# Patient Record
Sex: MALE | Race: WHITE | NOT HISPANIC OR LATINO | Employment: FULL TIME | URBAN - METROPOLITAN AREA
[De-identification: names, ages, dates, MRNs, and addresses within clinical notes are randomized per-mention and may not be internally consistent; named-entity substitution may affect disease eponyms.]

---

## 2022-08-02 ENCOUNTER — HOSPITAL ENCOUNTER (EMERGENCY)
Facility: CLINIC | Age: 32
Discharge: HOME | End: 2022-08-02
Attending: EMERGENCY MEDICINE | Admitting: EMERGENCY MEDICINE

## 2022-08-02 VITALS
SYSTOLIC BLOOD PRESSURE: 157 MMHG | RESPIRATION RATE: 16 BRPM | WEIGHT: 227 LBS | OXYGEN SATURATION: 97 % | DIASTOLIC BLOOD PRESSURE: 102 MMHG | HEART RATE: 81 BPM | TEMPERATURE: 98.2 F
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
## 2022-08-02 PROBLEM — R03.0 ELEVATED BP WITHOUT DIAGNOSIS OF HYPERTENSION: Status: ACTIVE | Noted: 2018-01-31

## 2022-08-02 PROBLEM — F12.90 MARIJUANA USE: Status: ACTIVE | Noted: 2018-07-30

## 2022-08-02 PROBLEM — F41.9 ANXIETY: Status: ACTIVE | Noted: 2022-01-20

## 2022-08-02 PROBLEM — K64.4 EXTERNAL HEMORRHOID: Status: ACTIVE | Noted: 2018-07-30

## 2022-08-02 PROBLEM — N48.9 PENILE LESION: Status: ACTIVE | Noted: 2022-01-20

## 2022-08-02 PROBLEM — B07.0 PLANTAR WARTS: Status: ACTIVE | Noted: 2022-01-20

## 2022-08-02 PROBLEM — R07.89 CHEST DISCOMFORT: Status: ACTIVE | Noted: 2018-07-30

## 2022-08-02 PROBLEM — H53.8 BLURRY VISION, BILATERAL: Status: ACTIVE | Noted: 2022-01-20

## 2022-08-02 PROBLEM — R06.83 SNORING: Status: ACTIVE | Noted: 2022-01-20

## 2022-08-02 PROCEDURE — 99282 EMERGENCY DEPT VISIT SF MDM: CPT

## 2022-08-02 RX ORDER — BENZOCAINE/MENTHOL 6 MG-10 MG
LOZENGE MUCOUS MEMBRANE 2 TIMES DAILY
Qty: 30 G | Refills: 0 | Status: SHIPPED | OUTPATIENT
Start: 2022-08-02

## 2022-08-02 ASSESSMENT — ENCOUNTER SYMPTOMS: WOUND: 1

## 2022-08-03 NOTE — ED PROVIDER NOTES
EMERGENCY DEPARTMENT ENCOUNTER     NAME: Darrin Baez   AGE: 32 year old male   YOB: 1990   MRN: [id number]   EVALUATION DATE & TIME: No admission date for patient encounter.   PCP: No primary care provider on file.     Chief Complaint   Patient presents with     Insect Bite   :    FINAL IMPRESSION       1. Insect bite, unspecified site, initial encounter           ED COURSE & MEDICAL DECISION MAKING      Pertinent Labs & Imaging studies reviewed. (See chart for details)   32 year old male  presents to the Emergency Department for evaluation of an insect bite on the leg, concerned about a tick bite. Initial Vitals Reviewed. Initial exam notable for well-appearing male who has several mosquito bites that are excoriated on his legs.  1 of these, that looks similar to others and is not consistent with a targetoid lesion with a tick bite and has no obvious evidence of an embedded tick shows some local tissue reaction likely from excoriation.  I provided reassurance and we discussed antihistamines and hydrocortisone cream.  He is comfortable with discharge at this time.        9:12 PM I met with the patient to gather history and to perform my initial exam. I discussed the plan for care while in the Emergency Department.   I discussed the plan for discharge with the patient, and patient is agreeable.  We discussed supportive cares at home and reasons for return to the ER including new or worsening symptoms - all questions and concerns addressed.  Patient to be discharged by RN.     At the conclusion of the encounter I discussed the results of all of the tests and the disposition. The questions were answered. The patient or family acknowledged understanding and was agreeable with the care plan.         MEDICATIONS GIVEN IN THE EMERGENCY:   Medications - No data to display   NEW PRESCRIPTIONS STARTED AT TODAY'S ER VISIT   New Prescriptions    No medications on file      ================================================================   HISTORY OF PRESENT ILLNESS       Patient information was obtained from: Patient   Use of Intrepreter: N/A   Darrin Baez is a 32 year old male with history of hypertension who presents for an insect bite.      Patient endorses being bitten by a mosquito on 7/30/2022. He says he has been itching it a lot. Today, patient states the red area has gotten bigger. He denies anything having been attached or latching onto the bite. Patient denies any other complaints at this time.     ================================================================    REVIEW OF SYSTEMS       Review of Systems   Skin: Positive for wound (Bite on right anterior lower part of leg).   All other systems reviewed and are negative.        PAST HISTORY     PAST MEDICAL HISTORY:   No past medical history on file.   PAST SURGICAL HISTORY:   No past surgical history on file.   CURRENT MEDICATIONS:   No current outpatient medications on file.    ALLERGIES:   Allergies   Allergen Reactions     Nsaids       FAMILY HISTORY:   No family history on file.   SOCIAL HISTORY:         VITALS  Patient Vitals for the past 24 hrs:   BP Temp Temp src Pulse Resp SpO2 Weight   08/02/22 2054 (!) 178/107 98.2  F (36.8  C) Temporal 81 16 97 % 103 kg (227 lb)        ================================================================    PHYSICAL EXAM     VITAL SIGNS: BP (!) 178/107   Pulse 81   Temp 98.2  F (36.8  C) (Temporal)   Resp 16   Wt 103 kg (227 lb)   SpO2 97%    Constitutional:  Awake, no acute distress   HENT:  Atraumatic, oropharynx without exudate or erythema, membranes moist  Lymph:  No adenopathy  Eyes: EOM intact, PERRL, no injection  Neck: Supple  Respiratory:  Clear to auscultation bilaterally, no wheezes or crackles   Cardiovascular:  Regular rate and rhythm, single S1 and S2   GI:  Soft, nontender, nondistended, no rebound or guarding   Musculoskeletal:  Moves all extremities,  no lower extremity edema, no deformities    Skin:  Warm, dry. Multiple excoriated mosquito bites and the one on the right lower shin has some surrounding local tissue reaction and erythema.   Neurologic:  Alert and oriented x3, no focal deficits noted         ================================================================  LAB       All pertinent labs reviewed and interpreted.   Labs Ordered and Resulted from Time of ED Arrival to Time of ED Departure - No data to display     ===============================================================  RADIOLOGY       Reviewed all pertinent imaging. Please see official radiology report.   No orders to display       ================================================================  PROCEDURES         I, Tegan Simons, am serving as a scribe to document services personally performed by Dr. Degroot based on my observation and the provider's statements to me. I, Mei Degroot MD attest that Tegan Simons is acting in a scribe capacity, has observed my performance of the services and has documented them in accordance with my direction.   Mei Degroot M.D.   Emergency Medicine   [facility] EMERGENCY ROOM  [address] [id number] [phone]  Dept: [phone]      Mei Degroot MD  08/02/22 1053

## 2022-08-03 NOTE — DISCHARGE INSTRUCTIONS
Fortunately this does not appear to be a tick bite and looks like local tissue reaction from likely a mosquito or other similar bite.  Use the cream provided and also take Benadryl to help with this.

## 2022-08-03 NOTE — ED TRIAGE NOTES
Here for bug bite on right anterior lower part of leg that occurred on Saturday   Pt concerned and anxious that it may be a tick bite   Denies any other concerns     Triage Assessment     Row Name 08/02/22 2055       Triage Assessment (Adult)    Airway WDL WDL       Respiratory WDL    Respiratory WDL WDL       Skin Circulation/Temperature WDL    Skin Circulation/Temperature WDL WDL       Cardiac WDL    Cardiac WDL WDL       Peripheral/Neurovascular WDL    Peripheral Neurovascular WDL WDL       Cognitive/Neuro/Behavioral WDL    Cognitive/Neuro/Behavioral WDL WDL

## 2024-04-19 ENCOUNTER — OFFICE VISIT (OUTPATIENT)
Dept: FAMILY MEDICINE | Facility: CLINIC | Age: 34
End: 2024-04-19
Payer: COMMERCIAL

## 2024-04-19 VITALS
OXYGEN SATURATION: 99 % | HEART RATE: 54 BPM | SYSTOLIC BLOOD PRESSURE: 145 MMHG | TEMPERATURE: 98 F | DIASTOLIC BLOOD PRESSURE: 85 MMHG | RESPIRATION RATE: 16 BRPM

## 2024-04-19 DIAGNOSIS — H66.90 ACUTE OTITIS MEDIA, UNSPECIFIED OTITIS MEDIA TYPE: ICD-10-CM

## 2024-04-19 DIAGNOSIS — H60.502 ACUTE OTITIS EXTERNA OF LEFT EAR, UNSPECIFIED TYPE: Primary | ICD-10-CM

## 2024-04-19 PROCEDURE — 99213 OFFICE O/P EST LOW 20 MIN: CPT | Performed by: PHYSICIAN ASSISTANT

## 2024-04-19 RX ORDER — AMOXICILLIN 875 MG
875 TABLET ORAL 2 TIMES DAILY
Qty: 14 TABLET | Refills: 0 | Status: SHIPPED | OUTPATIENT
Start: 2024-04-19 | End: 2024-04-26

## 2024-04-19 RX ORDER — CIPROFLOXACIN AND DEXAMETHASONE 3; 1 MG/ML; MG/ML
4 SUSPENSION/ DROPS AURICULAR (OTIC) 2 TIMES DAILY
Qty: 7.5 ML | Refills: 0 | Status: SHIPPED | OUTPATIENT
Start: 2024-04-19 | End: 2024-04-26

## 2024-04-19 NOTE — PROGRESS NOTES
Assessment & Plan     Acute otitis externa of left ear, unspecified type  Cipro dex, warm compresses.  Ibuprofen for pain.  Follow-up for persistent or worsening sx.  Added amoxil to cover otitis media, the portion of the TM I can see is erythematous and edematous, with previous hx of otitis as well.      - ciprofloxacin-dexAMETHasone (CIPRODEX) 0.3-0.1 % otic suspension  Dispense: 7.5 mL; Refill: 0  - amoxicillin (AMOXIL) 875 MG tablet  Dispense: 14 tablet; Refill: 0    Acute otitis media, unspecified otitis media type as above   - amoxicillin (AMOXIL) 875 MG tablet  Dispense: 14 tablet; Refill: 0        Rocio Montes De Oca PA-C  [facility]    Ross Clifford is a 34 year old male who presents to clinic today for the following health issues:  Chief Complaint   Patient presents with    Ear Problem     Has left ear pain     HPI  Pt presents with L ear pain   Plugged sensation fist, blunted hearing followd by  pain, drainage from the L ear.   No uri sx.    Hearing : Muffled.    Pain to the touch.    No ear bud use.  No trauma. No instrumentation to the L canal.  Hx of previous otitis.        Review of Systems  Constitutional, HEENT, cardiovascular, pulmonary, gi and gu systems are negative, except as otherwise noted.      Objective    BP (!) 145/85   Pulse 54   Temp 98  F (36.7  C) (Oral)   Resp 16   SpO2 99%   Physical Exam   Pt is in no acute distress and appears well  Ears patent B:  TM intact on the R non-injected. All land marks easily visibile on the R.   L has edematous and erythematous canal with TTP of the Auricle and pinna, swelling anterior canal primarily.  About 50% of the TM visible due to swelling and is brightly erythematous.  There is white thick debris in the canal. No blood noted.

## 2024-08-16 ENCOUNTER — OFFICE VISIT (OUTPATIENT)
Dept: FAMILY MEDICINE | Facility: CLINIC | Age: 34
End: 2024-08-16
Payer: COMMERCIAL

## 2024-08-16 VITALS
DIASTOLIC BLOOD PRESSURE: 90 MMHG | RESPIRATION RATE: 16 BRPM | SYSTOLIC BLOOD PRESSURE: 155 MMHG | OXYGEN SATURATION: 97 % | TEMPERATURE: 98.2 F | HEART RATE: 58 BPM | WEIGHT: 223 LBS

## 2024-08-16 DIAGNOSIS — J06.9 UPPER RESPIRATORY TRACT INFECTION, UNSPECIFIED TYPE: Primary | ICD-10-CM

## 2024-08-16 PROCEDURE — 99213 OFFICE O/P EST LOW 20 MIN: CPT | Performed by: PHYSICIAN ASSISTANT

## 2024-08-16 PROCEDURE — 87635 SARS-COV-2 COVID-19 AMP PRB: CPT | Performed by: PHYSICIAN ASSISTANT

## 2024-08-16 NOTE — PROGRESS NOTES
Assessment & Plan     Upper respiratory tract infection, unspecified type  Covid 19 pending.    If positive should seek treatment with paxlovid, pt is a candidate given hx of anxiety.   Push fluids, rest and ibuprofen or tylenol for comfort.    RTC for persistent or worsening sx.   At the end of the encounter, I discussed results, diagnosis, medications. Discussed red flags for immediate return to clinic/ER, as well as indications for follow up if no improvement. Patient understood and agreed to plan. Patient was stable for discharge      - Symptomatic COVID-19 Virus (Coronavirus) by PCR Nose         Rocio Montes De Oca PA-C  [facility]    Ross Clifford is a 34 year old male who presents to clinic today for the following health issues:  Chief Complaint   Patient presents with    Covid 19 Testing     Covid exposure. Girlfriend tested positive. Took test on Tuesday and negative. Yesterday started getting chest congestion, slight dry cough. Did test again yesterday and still negative.          8/16/2024     5:02 PM   Additional Questions   Roomed by Mary BOLAÑOS   Accompanied by Self     HPI  Pt is a 34-year-old male presents to urgent care with concerns regarding URI symptoms x 2 days.  He has been exposed to COVID-19 by his girlfriend.  Started getting symptoms 8-15-24 with burning chest in the morning, negative covid 19 yesterday and today increased sx with dry cough,   Mild cough.    Mild rhionrrhea congestion over the last 2 days.      Review of Systems  Constitutional, HEENT, cardiovascular, pulmonary, gi and gu systems are negative, except as otherwise noted.      Objective    BP (!) 155/90   Pulse 58   Temp 98.2  F (36.8  C) (Oral)   Resp 16   Wt 101.2 kg (223 lb)   SpO2 97%   Physical Exam             Patient Active Problem List   Diagnosis    Anxiety    Blurry vision, bilateral    Chest discomfort    Elevated BP without diagnosis of hypertension    External hemorrhoid     Penile lesion    Marijuana use    Plantar warts    Snoring

## 2024-08-17 ENCOUNTER — TELEPHONE (OUTPATIENT)
Dept: NURSING | Facility: CLINIC | Age: 34
End: 2024-08-17
Payer: COMMERCIAL

## 2024-08-17 LAB — SARS-COV-2 RNA RESP QL NAA+PROBE: POSITIVE

## 2024-08-17 NOTE — TELEPHONE ENCOUNTER
Coronavirus (COVID-19) Notification    Caller Name (Patient, parent, daughter/son, grandparent, etc)  Patient    Reason for call  Notify of Positive Coronavirus (COVID-19) lab results, assess symptoms,  review [facility] recommendations    Lab Result    Lab test:  2019-nCoV rRt-PCR or SARS-CoV-2 PCR    Oropharyngeal AND/OR nasopharyngeal swabs is POSITIVE for 2019-nCoV RNA/SARS-COV-2 PCR (COVID-19 virus)      Gather patient reported symptoms   Assessment   Current Symptoms at time of phone call, reported by patient: (if no symptoms, document: No symptoms] yes   Date of symptom(s) onset (if applicable) 08/15/24     If at time of call, Patients symptoms have worsened, the Patient should contact 911 or have someone drive them to Emergency Dept promptly:    If Patient calling 911, inform 911 personal that you have tested positive for the Coronavirus (COVID-19).  Place mask on and await 911 to arrive.  If Emergency Dept, If possible, please have another adult drive you to the Emergency Dept but you need to wear mask when in contact with other people.      Treatment Options:   Is patient interested in discussing COVID treatment? No.        Review information with Patient    Your result was positive. This means you have COVID-19 (coronavirus).    How can I protect others?    These guidelines are for isolating before returning to work, school or .    If you DO have symptoms  Stay home and away from others   For at least 5 days after your symptoms started, AND  You are fever free for 24 hours (with no medicine that reduces fever), AND  Your other symptoms are better  Wear a mask for 10 full days anytime you are around others    If you DON'T have symptoms  Stay home and away from others for at least 5 days after your positive test  Wear a mask for 10 full days anytime you are around others    There may be different guidelines for healthcare facilities.  Please check with the specific sites before arriving.    If  you have been told by a doctor that you were severely ill with COVID-19 or are immunocompromised, you should isolate for at least 10 days.    You should not go back to work until you meet the guidelines above for ending your home isolation. You don't need to be retested for COVID-19 before going back to work--studies show that you won't spread the virus if it's been at least 10 days since your symptoms started (or 20 days, if you have a weak immune system).    Employers, schools, and daycares: This is an official notice for this person's medical guidelines for returning in-person.  They must meet the above guidelines before going back to work, school or  in person.    You will receive a positive COVID-19 letter via [employer] or the mail soon with additional self-care information.    Would you like me to review some of that information with you now?  Yes    How can I take care of myself?    Get lots of rest. Drink extra fluids (unless a doctor has told you not to).    Take Tylenol (acetaminophen) for fever or pain. If you have liver or kidney problems, ask your family doctor if it's okay to take Tylenol.     Take either:   650 mg (two 325 mg pills) every 4 to 6 hours, or   1,000 mg (two 500 mg pills) every 8 hours as needed.   Note: Do not take more than 3,000 mg in one day. Acetaminophen is found in many medicines (both prescribed and over-the-counter medicines). Read all labels to be sure you don't take too much.    For children, check the Tylenol bottle for the right dose (based on their age or weight).    If you have other health problems (like cancer, heart failure, an organ transplant or severe kidney disease): Call your specialty clinic if you don't feel better in the next 2 days.    Know when to call 911: Emergency warning signs include:  Trouble breathing or shortness of breath  Pain or pressure in the chest that doesn't go away  Feeling confused like you haven't felt before, or not being able to wake  up  Bluish-colored lips or face      If you were tested for an upcoming procedure, please contact your provider for next steps.    Karen Espinal

## 2024-09-13 ENCOUNTER — OFFICE VISIT (OUTPATIENT)
Dept: FAMILY MEDICINE | Facility: CLINIC | Age: 34
End: 2024-09-13
Payer: COMMERCIAL

## 2024-09-13 VITALS
TEMPERATURE: 98.4 F | DIASTOLIC BLOOD PRESSURE: 76 MMHG | SYSTOLIC BLOOD PRESSURE: 134 MMHG | HEART RATE: 62 BPM | WEIGHT: 223 LBS | OXYGEN SATURATION: 96 %

## 2024-09-13 DIAGNOSIS — H66.002 ACUTE SUPPURATIVE OTITIS MEDIA OF LEFT EAR WITHOUT SPONTANEOUS RUPTURE OF TYMPANIC MEMBRANE, RECURRENCE NOT SPECIFIED: Primary | ICD-10-CM

## 2024-09-13 PROCEDURE — 99203 OFFICE O/P NEW LOW 30 MIN: CPT | Performed by: PHYSICIAN ASSISTANT

## 2024-09-13 RX ORDER — AMOXICILLIN 875 MG
875 TABLET ORAL 2 TIMES DAILY
Qty: 14 TABLET | Refills: 0 | Status: SHIPPED | OUTPATIENT
Start: 2024-09-13 | End: 2024-09-20

## 2024-09-13 NOTE — PROGRESS NOTES
Assessment & Plan     1. Acute suppurative otitis media of left ear without spontaneous rupture of tympanic membrane, recurrence not specified  The patient has an exam consistent with otitis media.  There is no sign of perforation, mastoiditis. There may be a component of otitis externa, but patient does not want ear drops. Patients symptoms are improved after ear lavage. Discussed if pain does not return in 3 days, OK to forego abx. Prescription for amoxicillin sent to pharmacy. He may take Tylenol for pain.  Return if increasing pain, fever, hearing decrease or discharge.      - amoxicillin (AMOXIL) 875 MG tablet; Take 1 tablet (875 mg) by mouth 2 times daily for 7 days.  Dispense: 14 tablet; Refill: 0        Return in about 3 days (around 9/16/2024), or if symptoms worsen or fail to improve.    Diagnosis and treatment plan was reviewed with patient and/or family.   We went over any labs or imaging. Discussed worsening symptoms or little to no relief despite treatment plan to follow-up with PCP or return to clinic.  Patient verbalizes understanding. All questions were addressed and answered.     Daphne Jean Baptiste PA-C  [facility]    CHIEF COMPLAINT:   Chief Complaint   Patient presents with    Ear Problem     Pain in ear tenderness behind left ear also feels clogged. X 2 days.     Ross Clifford is a 34 year old male who presents to clinic today for evaluation of left-sided ear pain.  Symptoms started couple days ago, worsened last night.  Feels a little bit better today.  Hearing is muffled. He has been using OTC ear drops. No fever or chills.     Hx of otitis media and otitis externa back in April.       No past medical history on file.  No past surgical history on file.  Social History     Tobacco Use    Smoking status: Never    Smokeless tobacco: Never   Substance Use Topics    Alcohol use: Not on file     Current Outpatient Medications   Medication Sig Dispense Refill     amoxicillin (AMOXIL) 875 MG tablet Take 1 tablet (875 mg) by mouth 2 times daily for 7 days. 14 tablet 0    hydrocortisone (CORTAID) 1 % external cream Apply topically 2 times daily (Patient not taking: Reported on 4/19/2024) 30 g 0     No current facility-administered medications for this visit.     Allergies   Allergen Reactions    Nsaids        10 point ROS of systems were all negative except for pertinent positives noted in my HPI.      Exam:   /76   Pulse 62   Temp 98.4  F (36.9  C)   Wt 101.2 kg (223 lb)   SpO2 96%   Constitutional: healthy, alert and no distress  Head: Normocephalic, atraumatic.  ENT: L Tm is eythematous and bulging. R TM normal. Canal with slight inflammation on the left. nasal mucosa pink and moist, throat without tonsillar hypertrophy or erythema  Neck: neck is supple, no cervical lymphadenopathy or nuchal rigidity  Cardiovascular: RRR  Respiratory: CTA bilaterally, no rhonchi or rales      No results found for any visits on 09/13/24.

## 2024-09-16 ENCOUNTER — TELEPHONE (OUTPATIENT)
Dept: NURSING | Facility: CLINIC | Age: 34
End: 2024-09-16
Payer: COMMERCIAL

## 2024-09-16 NOTE — TELEPHONE ENCOUNTER
S-(situation):   Patient calling for advice if to flush ear.    B-(background):   9/13/2024  Acute suppurative otitis media of left ear without spontaneous rupture of tympanic membrane, recurrence not specified       Return in about 3 days (around 9/16/2024), or if symptoms worsen or fail to improve.       A-(assessment):   Pt has started antibiotics.  Noted discharge today and pain returning.    Pt wanted to know if he should (self) ear lavage.  Advised to monitor, introducing more bacteria to area may increase risk of infection.    R-(recommendations):   Advised an appoint to reassess ear.  Pt declined appointment at this time.    Patient instructed to call back if symptoms change or if new symptoms present. Patient verbalizes agreement with plan.    Florencia RN, BSN  [place], [place]

## 2024-09-23 ENCOUNTER — OFFICE VISIT (OUTPATIENT)
Dept: FAMILY MEDICINE | Facility: CLINIC | Age: 34
End: 2024-09-23
Payer: COMMERCIAL

## 2024-09-23 ENCOUNTER — TELEPHONE (OUTPATIENT)
Dept: FAMILY MEDICINE | Facility: CLINIC | Age: 34
End: 2024-09-23

## 2024-09-23 VITALS
TEMPERATURE: 98.7 F | RESPIRATION RATE: 18 BRPM | DIASTOLIC BLOOD PRESSURE: 68 MMHG | BODY MASS INDEX: 32.34 KG/M2 | OXYGEN SATURATION: 98 % | HEART RATE: 60 BPM | WEIGHT: 231 LBS | HEIGHT: 71 IN | SYSTOLIC BLOOD PRESSURE: 130 MMHG

## 2024-09-23 DIAGNOSIS — Z28.21 IMMUNIZATION DECLINED: ICD-10-CM

## 2024-09-23 DIAGNOSIS — H60.502 ACUTE OTITIS EXTERNA OF LEFT EAR, UNSPECIFIED TYPE: Primary | ICD-10-CM

## 2024-09-23 DIAGNOSIS — Z00.01 ENCOUNTER FOR ROUTINE ADULT MEDICAL EXAM WITH ABNORMAL FINDINGS: ICD-10-CM

## 2024-09-23 DIAGNOSIS — R06.83 SNORING: ICD-10-CM

## 2024-09-23 PROCEDURE — 99395 PREV VISIT EST AGE 18-39: CPT

## 2024-09-23 PROCEDURE — 99213 OFFICE O/P EST LOW 20 MIN: CPT | Mod: 25

## 2024-09-23 RX ORDER — CHLORAL HYDRATE 500 MG
2 CAPSULE ORAL DAILY
COMMUNITY

## 2024-09-23 RX ORDER — DIAPER,BRIEF,ADULT, DISPOSABLE
EACH MISCELLANEOUS
COMMUNITY

## 2024-09-23 RX ORDER — OFLOXACIN 3 MG/ML
1-2 SOLUTION/ DROPS OPHTHALMIC DAILY
Qty: 10 ML | Refills: 1 | Status: SHIPPED | OUTPATIENT
Start: 2024-09-23

## 2024-09-23 NOTE — TELEPHONE ENCOUNTER
Patient calling in had discussed using these with provider at [facility].    He found his other ear drops at home but they are   - ciprofloxacin-dexAMETHasone (CIPRODEX) 0.3-0.1 % otic suspension and not - ofloxacin (OCUFLOX) 0.3 % ophthalmic solution;   They are not  and he says he has a little over half a bottle.     Per provider via secure chat ok to use,     1-2 drops for 10 days RN called patient and relayed, patient agreeable.    ROBYN Ahumada  [facility]  [phone]    [facility]   Monday  - Thursday 7 AM - 6 PM    Friday  7 AM - 5 PM     -Please call your clinic for assistance from a nurse after hours.

## 2024-09-23 NOTE — PATIENT INSTRUCTIONS
Patient Education   Preventive Care Advice   This is general advice given by our system to help you stay healthy. However, your care team may have specific advice just for you. Please talk to your care team about your preventive care needs.  Nutrition  Eat 5 or more servings of fruits and vegetables each day.  Try wheat bread, brown rice and whole grain pasta (instead of white bread, rice, and pasta).  Get enough calcium and vitamin D. Check the label on foods and aim for 100% of the RDA (recommended daily allowance).  Lifestyle  Exercise at least 150 minutes each week  (30 minutes a day, 5 days a week).  Do muscle strengthening activities 2 days a week. These help control your weight and prevent disease.  No smoking.  Wear sunscreen to prevent skin cancer.  Have a dental exam and cleaning every 6 months.  Yearly exams  See your health care team every year to talk about:  Any changes in your health.  Any medicines your care team has prescribed.  Preventive care, family planning, and ways to prevent chronic diseases.  Shots (vaccines)   HPV shots (up to age 26), if you've never had them before.  Hepatitis B shots (up to age 59), if you've never had them before.  COVID-19 shot: Get this shot when it's due.  Flu shot: Get a flu shot every year.  Tetanus shot: Get a tetanus shot every 10 years.  Pneumococcal, hepatitis A, and RSV shots: Ask your care team if you need these based on your risk.  Shingles shot (for age 50 and up)  General health tests  Diabetes screening:  Starting at age 35, Get screened for diabetes at least every 3 years.  If you are younger than age 35, ask your care team if you should be screened for diabetes.  Cholesterol test: At age 39, start having a cholesterol test every 5 years, or more often if advised.  Bone density scan (DEXA): At age 50, ask your care team if you should have this scan for osteoporosis (brittle bones).  Hepatitis C: Get tested at least once in your life.  STIs (sexually  transmitted infections)  Before age 24: Ask your care team if you should be screened for STIs.  After age 24: Get screened for STIs if you're at risk. You are at risk for STIs (including HIV) if:  You are sexually active with more than one person.  You don't use condoms every time.  You or a partner was diagnosed with a sexually transmitted infection.  If you are at risk for HIV, ask about PrEP medicine to prevent HIV.  Get tested for HIV at least once in your life, whether you are at risk for HIV or not.  Cancer screening tests  Cervical cancer screening: If you have a cervix, begin getting regular cervical cancer screening tests starting at age 21.  Breast cancer scan (mammogram): If you've ever had breasts, begin having regular mammograms starting at age 40. This is a scan to check for breast cancer.  Colon cancer screening: It is important to start screening for colon cancer at age 45.  Have a colonoscopy test every 10 years (or more often if you're at risk) Or, ask your provider about stool tests like a FIT test every year or Cologuard test every 3 years.  To learn more about your testing options, visit:   .  For help making a decision, visit:   https://bit.ly/fi51900.  Prostate cancer screening test: If you have a prostate, ask your care team if a prostate cancer screening test (PSA) at age 55 is right for you.  Lung cancer screening: If you are a current or former smoker ages 50 to 80, ask your care team if ongoing lung cancer screenings are right for you.  For informational purposes only. Not to replace the advice of your health care provider. Copyright   2023 [facility]. All rights reserved. Clinically reviewed by the [facility]. [employer] 719674 - REV 01/24.  Substance Use Disorder: Care Instructions  Overview     You can improve your life and health by stopping your use of alcohol or drugs. When you don't drink or use drugs, you may feel and sleep better. You may  get along better with your family, friends, and coworkers. There are medicines and programs that can help with substance use disorder.  How can you care for yourself at home?  Here are some ways to help you stay sober and prevent relapse.  If you have been given medicine to help keep you sober or reduce your cravings, be sure to take it exactly as prescribed.  Talk to your doctor about programs that can help you stop using drugs or drinking alcohol.  Do not keep alcohol or drugs in your home.  Plan ahead. Think about what you'll say if other people ask you to drink or use drugs. Try not to spend time with people who drink or use drugs.  Use the time and money spent on drinking or drugs to do something that's important to you.  Preventing a relapse  Have a plan to deal with relapse. Learn to recognize changes in your thinking that lead you to drink or use drugs. Get help before you start to drink or use drugs again.  Try to stay away from situations, friends, or places that may lead you to drink or use drugs.  If you feel the need to drink alcohol or use drugs again, seek help right away. Call a trusted friend or family member. Some people get support from organizations such as Narcotics Anonymous or [employer] or from treatment facilities.  If you relapse, get help as soon as you can. Some people make a plan with another person that outlines what they want that person to do for them if they relapse. The plan usually includes how to handle the relapse and who to notify in case of relapse.  Don't give up. Remember that a relapse doesn't mean that you have failed. Use the experience to learn the triggers that lead you to drink or use drugs. Then quit again. Recovery is a lifelong process. Many people have several relapses before they are able to quit for good.  Follow-up care is a key part of your treatment and safety. Be sure to make and go to all appointments, and call your doctor if you are having problems. It's  "also a good idea to know your test results and keep a list of the medicines you take.  When should you call for help?   Call 911  anytime you think you may need emergency care. For example, call if you or someone else:    Has overdosed or has withdrawal signs. Be sure to tell the emergency workers that you are or someone else is using or trying to quit using drugs. Overdose or withdrawal signs may include:  Losing consciousness.  Seizure.  Seeing or hearing things that aren't there (hallucinations).     Is thinking or talking about suicide or harming others.   Where to get help 24 hours a day, 7 days a week   If you or someone you know talks about suicide, self-harm, a mental health crisis, a substance use crisis, or any other kind of emotional distress, get help right away. You can:    Call the Suicide and Crisis Lifeline at 988.     Call 2-755-251-TALK (1-651.341.7486).     Text HOME to 141309 to access the Crisis Text Line.   Consider saving these numbers in your phone.  Go to [employer] for more information or to chat online.  Call your doctor now or seek immediate medical care if:    You are having withdrawal symptoms. These may include nausea or vomiting, sweating, shakiness, and anxiety.   Watch closely for changes in your health, and be sure to contact your doctor if:    You have a relapse.     You need more help or support to stop.   Where can you learn more?  Go to https://www.[employer].net/patiented  Enter H573 in the search box to learn more about \"Substance Use Disorder: Care Instructions.\"  Current as of: November 15, 2023               Content Version: 14.0    1688-4456 [employer].   Care instructions adapted under license by your healthcare professional. If you have questions about a medical condition or this instruction, always ask your healthcare professional. [employer] disclaims any warranty or liability for your use of this information.      Eating Healthy " "Foods: Care Instructions  With every meal, you can make healthy food choices. Try to eat a variety of fruits, vegetables, whole grains, lean proteins, and low-fat dairy products. This can help you get the right balance of nutrients, including vitamins and minerals. Small changes add up over time. You can start by adding one healthy food to your meals each day.    Try to make half your plate fruits and vegetables, one-fourth whole grains, and one-fourth lean proteins. Try including dairy with your meals.   Eat more fruits and vegetables. Try to have them with most meals and snacks.   Foods for healthy eating    Fruits    These can be fresh, frozen, canned, or dried.  Try to choose whole fruit rather than fruit juice.  Eat a variety of colors.    Vegetables    These can be fresh, frozen, canned, or dried.  Beans, peas, and lentils count too.    Whole grains    Choose whole-grain breads, cereals, and noodles.  Try brown rice.    Lean proteins    These can include lean meat, poultry, fish, and eggs.  You can also have tofu, beans, peas, lentils, nuts, and seeds.    Dairy    Try milk, yogurt, and cheese.  Choose low-fat or fat-free when you can.  If you need to, use lactose-free milk or fortified plant-based milk products, such as soy milk.    Water    Drink water when you're thirsty.  Limit sugar-sweetened drinks, including soda, fruit drinks, and sports drinks.  Where can you learn more?  Go to https://www.[employer].net/patiented  Enter T756 in the search box to learn more about \"Eating Healthy Foods: Care Instructions.\"  Current as of: September 20, 2023               Content Version: 14.0    9471-9664 [employer].   Care instructions adapted under license by your healthcare professional. If you have questions about a medical condition or this instruction, always ask your healthcare professional. [employer] disclaims any warranty or liability for your use of this information.         "

## 2024-09-23 NOTE — PROGRESS NOTES
"Preventive Care Visit  [facility]  Edison Toure DO, Family Medicine  Sep 23, 2024      Assessment & Plan     Encounter for routine adult medical exam with abnormal findings  Patient is establishing care today  Overall he is doing well in his life  He does endorse anxiety but does not want to treat this at this time I would recommend Lexapro in the future to preferred least side effects  He does not want to do flu or COVID vaccines today.  He does not want to check for hepatitis C, glucose, HIV today  We will follow-up in 1 year or sooner if there are acute complaints      Acute otitis externa of left ear, unspecified type  Approximately 2 weeks ago he had acute otitis media that was solved with amoxicillin  His outer ear is now painful with discharge  He has had this in the past and was not able to find the drops  For financial reasons we will send the ophthalmic solution that he can use in the ear  If this does not resolve he can see an ENT due to the recurrent nature of these  - ofloxacin (OCUFLOX) 0.3 % ophthalmic solution; Place 1-2 drops Into the left ear daily.    Snoring  I believe he has structural snoring.  I do not believe he has a sleep disorder as he does not fall asleep in brief times its only when he is unstimulated.  This may also be the cause of his sore throat or vice versa  We will send to his sleep surgeon for evaluation of his ears, throat, snoring    Immunization declined  Patient does not want to do COVID or flu vaccine today      Patient has been advised of split billing requirements and indicates understanding: Yes              BMI  Estimated body mass index is 31.81 kg/m  as calculated from the following:    Height as of this encounter: 1.815 m (5' 11.46\").    Weight as of this encounter: 104.8 kg (231 lb).       Counseling  Appropriate preventive services were addressed with this patient via screening, questionnaire, or discussion as appropriate for fall prevention, " nutrition, physical activity, Tobacco-use cessation, social engagement, weight loss and cognition.  Checklist reviewing preventive services available has been given to the patient.  Reviewed patient's diet, addressing concerns and/or questions.   He is at risk for lack of exercise and has been provided with information to increase physical activity for the benefit of his well-being.         Ross Clifford is a 34 year old, presenting for the following:  Physical (Has list of issues - briefly said it involves left ear discomfort and snoring )        9/23/2024    12:47 PM   Additional Questions   Roomed by lindsey person   Accompanied by farida         9/23/2024   Forms   Any forms needing to be completed Yes           Health Care Directive  Patient does not have a Health Care Directive or Living Will: Discussed advance care planning with patient; information given to patient to review.    HPI    Ear  DESCRIPTION: ear fullness and pain. Itching. No pain behind the ear  DURATION: approximately 2 weeks  ASSOCIATION SYMPTOMS: decreased hearing  RELIEF TRIALS: course of amoxicillin  PMH: frequent ear infections as a kid. Catching in his throat without pain    Throat  For approximately 1 year patient has been having a catching sensation in his throat    Sleeping/Snoring  Significant other has been complaining of snoring for years  Has not noticed any apneic concerns  Does not wake up gasping for air  Will fall asleep if the car is warm and long car ride but not at a stop light  Only really falls asleep if not stimulated   Getting 6-8 hours of sleep a night    Anxiety  History of panic attacks that started approximate 11 years ago  Has not been diagnosed with anxiety  Believes he is a hypochondriac and worries about stresses of life                9/23/2024   General Health   How would you rate your overall physical health? Excellent   Feel stress (tense, anxious, or unable to sleep) To some extent      (!) STRESS CONCERN       9/23/2024   Nutrition   Three or more servings of calcium each day? Yes   Diet: Regular (no restrictions)   How many servings of fruit and vegetables per day? (!) 2-3   How many sweetened beverages each day? 0-1            9/23/2024   Exercise   Days per week of moderate/strenous exercise 3 days            9/23/2024   Social Factors   Frequency of gathering with friends or relatives Once a week   Worry food won't last until get money to buy more No   Food not last or not have enough money for food? No   Do you have housing? (Housing is defined as stable permanent housing and does not include staying ouside in a car, in a tent, in an abandoned building, in an overnight shelter, or couch-surfing.) Yes   Are you worried about losing your housing? No   Lack of transportation? No   Unable to get utilities (heat,electricity)? No            9/23/2024   Dental   Dentist two times every year? Yes            9/23/2024   TB Screening   Were you born outside of the [place]? No            Today's PHQ-2 Score:       9/23/2024    12:40 PM   PHQ-2 ( 1999 Pfizer)   Q1: Little interest or pleasure in doing things 0   Q2: Feeling down, depressed or hopeless 1   PHQ-2 Score 1   Q1: Little interest or pleasure in doing things Not at all   Q2: Feeling down, depressed or hopeless Several days   PHQ-2 Score 1           9/23/2024   Substance Use   Alcohol more than 3/day or more than 7/wk No   Do you use any other substances recreationally? (!) CANNABIS PRODUCTS        Social History     Tobacco Use    Smoking status: Never     Passive exposure: Never    Smokeless tobacco: Never   Vaping Use    Vaping status: Never Used             9/23/2024   One time HIV Screening   Previous HIV test? I don't know          9/23/2024   STI Screening   New sexual partner(s) since last STI/HIV test? No            9/23/2024   Contraception/Family Planning   Questions about contraception or family planning No             Reviewed and updated as needed this visit by  "Provider      GENERAL: No fever, chills, weight loss or gain  HEENT: sore throat. Describes a catching in his throat. Changes in hearing. Ear itching.   CARDIAC: Denies chest pain or shortness of breath  LUNG: Denies dyspnea on exertion or chest pain  ABD: Denies pain, nausea, vomiting, diarrhea, constipation  SKIN: Denies new rashes  NEURO: No numbness, weakness, tingling   MSK: No weakness  PSYCH: increasing anxiety       Objective    Exam  /68   Pulse 60   Temp 98.7  F (37.1  C) (Oral)   Resp 18   Ht 1.815 m (5' 11.46\")   Wt 104.8 kg (231 lb)   SpO2 98%   BMI 31.81 kg/m     Estimated body mass index is 31.81 kg/m  as calculated from the following:    Height as of this encounter: 1.815 m (5' 11.46\").    Weight as of this encounter: 104.8 kg (231 lb).    Physical Exam  Vitals reviewed.   Constitutional:       Appearance: Normal appearance.   HENT:      Head: Normocephalic and atraumatic.      Right Ear: Tympanic membrane, ear canal and external ear normal.      Left Ear: Tympanic membrane normal.      Ears:      Comments: Erythematous canal with purulent discharge throughout. No mastoid pain     Nose: Nose normal.      Mouth/Throat:      Mouth: Mucous membranes are moist.   Eyes:      Extraocular Movements: Extraocular movements intact.      Pupils: Pupils are equal, round, and reactive to light.   Cardiovascular:      Rate and Rhythm: Normal rate and regular rhythm.      Heart sounds: Normal heart sounds.   Pulmonary:      Effort: Pulmonary effort is normal.      Breath sounds: Normal breath sounds.   Abdominal:      General: Abdomen is flat. Bowel sounds are normal.      Palpations: Abdomen is soft.   Musculoskeletal:      Cervical back: Normal range of motion and neck supple.   Skin:     General: Skin is warm and dry.   Neurological:      General: No focal deficit present.      Mental Status: He is alert.   Psychiatric:         Mood and Affect: Mood normal.         Behavior: Behavior normal. "         Signed Electronically by: Edison Toure DO

## 2024-10-06 ENCOUNTER — HEALTH MAINTENANCE LETTER (OUTPATIENT)
Age: 34
End: 2024-10-06

## 2024-10-17 ENCOUNTER — OFFICE VISIT (OUTPATIENT)
Dept: FAMILY MEDICINE | Facility: CLINIC | Age: 34
End: 2024-10-17
Payer: COMMERCIAL

## 2024-10-17 VITALS
TEMPERATURE: 98.2 F | OXYGEN SATURATION: 97 % | WEIGHT: 225 LBS | BODY MASS INDEX: 30.98 KG/M2 | RESPIRATION RATE: 16 BRPM | DIASTOLIC BLOOD PRESSURE: 77 MMHG | HEART RATE: 67 BPM | SYSTOLIC BLOOD PRESSURE: 137 MMHG

## 2024-10-17 DIAGNOSIS — H61.22 IMPACTED CERUMEN OF LEFT EAR: Primary | ICD-10-CM

## 2024-10-17 PROCEDURE — 99213 OFFICE O/P EST LOW 20 MIN: CPT | Performed by: FAMILY MEDICINE

## 2024-10-17 NOTE — PROGRESS NOTES
Assessment:       Impacted cerumen of left ear       Plan:     Patient with impacted cerumen of his left ear.  Ear lavage performed with complete resolution of patient's symptoms.  Follow-up if symptoms return.      Subjective:       34 year old male presents for evaluation of a plugged left ear.  He was seen a couple of weeks ago and treated for acute otitis externa as well as acute otitis media.  He denies any pain and has not had any fevers but feels like his ear is plugged.  Right ear has been unaffected.  No ringing in his ear or vertigo.    Patient Active Problem List   Diagnosis    Anxiety    Blurry vision, bilateral    Chest discomfort    Elevated BP without diagnosis of hypertension    External hemorrhoid    Penile lesion    Marijuana use    Plantar warts    Snoring       No past medical history on file.    No past surgical history on file.    Current Outpatient Medications   Medication Sig Dispense Refill    fish oil-omega-3 fatty acids 1000 MG capsule Take 2 g by mouth daily.      hydrocortisone (CORTAID) 1 % external cream Apply topically 2 times daily 30 g 0    ofloxacin (OCUFLOX) 0.3 % ophthalmic solution Place 1-2 drops Into the left ear daily. 10 mL 1    WHEY PROTEIN PO Reported on 4/20/2017      Whey Protein POWD Reported on 4/20/2017       No current facility-administered medications for this visit.       Allergies   Allergen Reactions    Nsaids     Ibuprofen Rash       Family History   Problem Relation Age of Onset    Hypertension Mother     Intracerebral hemorrhage Mother     Lung Cancer Father     Cerebrovascular Disease Maternal Grandmother        Social History     Socioeconomic History    Marital status: Single   Tobacco Use    Smoking status: Never     Passive exposure: Never    Smokeless tobacco: Never   Vaping Use    Vaping status: Never Used   Substance and Sexual Activity    Sexual activity: Yes     Partners: Female     Birth control/protection: Condom           Review of  Systems  Pertinent items are noted in HPI.      Objective:     /77   Pulse 67   Temp 98.2  F (36.8  C)   Resp 16   Wt 102.1 kg (225 lb)   SpO2 97%   BMI 30.98 kg/m       General appearance: alert, appears stated age, and cooperative  Ears: Cerumen noted in the left ear canal impacting the ear canal.  Ear lavage performed with successful removal of cerumen and relief of patient's symptoms.  Tympanic membrane is normal.  Ear canal otherwise unremarkable.  Right ear canal and tympanic membrane are both normal.      This note has been dictated using voice recognition software. Any grammatical or context distortions are unintentional and inherent to the software

## 2024-10-23 ENCOUNTER — NURSE TRIAGE (OUTPATIENT)
Dept: NURSING | Facility: CLINIC | Age: 34
End: 2024-10-23
Payer: COMMERCIAL

## 2024-10-23 DIAGNOSIS — H92.09 OTALGIA, UNSPECIFIED LATERALITY: Primary | ICD-10-CM

## 2024-10-23 NOTE — TELEPHONE ENCOUNTER
In and out of clinic for on going ear infection. Seen less than one week ago. Flushed out ear, clogged ear. Now over last few days having symptoms of infection again, draining and pain. Something is lingering. Doesn't want to go in to urgent care since he's been seen in the clinic so many times for this ear. Wants refill of amoxicillin. He still has ear drops from one month ago. It's the left ear. On going for months. He would like a call back from the clinic and something prescribed. I told him after hours and on weekends, they would want him to be seen. He can be reached at:   [phone]. May leave a detailed message. Pharmacy: [facility].  Chichi Cox RN  [facility]    Reason for Disposition   [1] Follow-up call to recent contact AND [2] information only call, no triage required    Additional Information   Negative: [1] Caller is not with the adult (patient) AND [2] reporting urgent symptoms   Negative: Lab result questions   Negative: Medication questions   Negative: Caller can't be reached by phone   Negative: Caller has already spoken to PCP or another triager   Negative: RN needs further essential information from caller in order to complete triage   Negative: Requesting regular office appointment   Negative: [1] Caller requesting NON-URGENT health information AND [2] PCP's office is the best resource   Negative: Question about upcoming scheduled test, no triage required and triager able to answer question   Negative: [1] Caller is not with the adult (patient) AND [2] probable NON-URGENT symptoms   Negative: Health Information question, no triage required and triager able to answer question   Negative: General information question, no triage required and triager able to answer question    Protocols used: Information Only Call - No Triage-A-

## 2024-10-24 NOTE — TELEPHONE ENCOUNTER
Edison Toure, DO to [facility] - Primary Care   10/24/24  7:54 AM  If he is having drainage, it is unlikely the middle ear causing problems as that does not drain. The drops he has is best, I put in a referral to an ear specialist to look into why he keeps having this    Patient returned call and I relayed message. He voiced that he was not necessarily satisfied with those recommendations and he may decide to come in to [facility] to see if someone can prescribed oral antibiotics. I reviewed again that the ear drops were recommended and asked if he needed a refill. He said they were too expensive and declined refill. I offered looking and clinic schedule and he declined. I gave ENT referral # to schedule. He had no further questions.

## 2024-10-24 NOTE — TELEPHONE ENCOUNTER
FUTURE VISIT INFORMATION      FUTURE VISIT INFORMATION:  Date: 10/30/24  Time: 9:40AM  [place] [facility] [place]  REFERRAL INFORMATION:  Referring provider:  Edison Toure DO   Referring providers clinic:  [facility]  Reason for visit/diagnosis  Per Pt, dx otalgia referral from kari West in [facility]     RECORDS REQUESTED FROM:       Clinic name Comments Records Status Imaging Status   [facility] 10/17/24- OV w. Sandy Calix MD   9/23/24- OV bernadine. Edison Toure DO   9/13/24- OV b[place]adine. Daphne Jean Baptiste PA-C   4/19/24- Ov b[place]adine. Rocio Montes De Oca PA-C  [facility]     [facility]  6/23/20- Ov w. Vicente Osborne MBBS    6/7/20- Ov w. Karime Leiva NP   [facility]

## 2024-10-28 DIAGNOSIS — Z01.10 ENCOUNTER FOR HEARING TEST: Primary | ICD-10-CM

## 2024-10-30 ENCOUNTER — PRE VISIT (OUTPATIENT)
Dept: OTOLARYNGOLOGY | Facility: CLINIC | Age: 34
End: 2024-10-30

## 2024-11-07 ENCOUNTER — OFFICE VISIT (OUTPATIENT)
Dept: URGENT CARE | Facility: URGENT CARE | Age: 34
End: 2024-11-07
Payer: COMMERCIAL

## 2024-11-07 VITALS
TEMPERATURE: 98.7 F | RESPIRATION RATE: 15 BRPM | HEART RATE: 81 BPM | SYSTOLIC BLOOD PRESSURE: 133 MMHG | DIASTOLIC BLOOD PRESSURE: 88 MMHG | OXYGEN SATURATION: 97 %

## 2024-11-07 DIAGNOSIS — H60.92 OTITIS EXTERNA OF LEFT EAR, UNSPECIFIED CHRONICITY, UNSPECIFIED TYPE: Primary | ICD-10-CM

## 2024-11-07 PROCEDURE — 99213 OFFICE O/P EST LOW 20 MIN: CPT | Performed by: FAMILY MEDICINE

## 2024-11-07 RX ORDER — OFLOXACIN 3 MG/ML
1-2 SOLUTION/ DROPS OPHTHALMIC
Qty: 10 ML | Refills: 0 | Status: SHIPPED | OUTPATIENT
Start: 2024-11-07 | End: 2024-11-17

## 2024-11-07 NOTE — PROGRESS NOTES
Assessment & Plan     Otitis externa of left ear, unspecified chronicity, unspecified type  Left OE  drops  - ofloxacin (OCUFLOX) 0.3 % ophthalmic solution  Dispense: 10 mL; Refill: 0             No follow-ups on file.    Patel Nieto MD  [facility]    Ross Clifford is a 34 year old male who presents to clinic today for the following health issues:  Chief Complaint   Patient presents with    Ear Problem     Ringing for 2 months. Seen here for 4-5 times same reason and still not feeling better.       HPI    Ear problems   Ongoing  Multiple visits  Too expensive to see ENT  Drops  Would like them written as eye drops as cheaper  Medicine too expensive to afford        Review of Systems        Objective    /88   Pulse 81   Temp 98.7  F (37.1  C)   Resp 15   SpO2 97%   Physical Exam  Vitals and nursing note reviewed.   Constitutional:       Appearance: Normal appearance.   HENT:      Ears:      Comments: Left ear canal red/inflammed but TM wnl  Some debris in canal from inflammation  Neurological:      Mental Status: He is alert.

## 2024-12-24 DIAGNOSIS — H92.09 OTALGIA, UNSPECIFIED LATERALITY: Primary | ICD-10-CM

## 2024-12-24 NOTE — TELEPHONE ENCOUNTER
General Call      Reason for Call:  ongoing inner ear infection    What are your questions or concerns:  patient states that he has an ongoing ear infection and woke up with ear numbness, and inner pain, and swollen lymph nodes. No RN available for triage at this time, please call patient   To triage as appropriate, offered patient to do an e-visit as needed if no RN call back.    Patient is requesting amox. For inner ear infection    Preferred pharm: [id number] [place], [place] - [address]  AT [facility]       Could we send this information to you in [employer] or would you prefer to receive a phone call?:   Patient would prefer a phone call   Okay to leave a detailed message?: Yes at Cell number on file:    Telephone Information:   Mobile [phone]

## 2024-12-24 NOTE — TELEPHONE ENCOUNTER
S-(situation): outgoing call to patient, it has been ongoing for a long time with his ear issues. He has been using ear drops when needed, but he woke up with more symptoms today.     B-(background):    11/7/24  Otitis externa of left ear, unspecified chronicity, unspecified type  Left OE  drops  - ofloxacin (OCUFLOX) 0.3 % ophthalmic solution  Dispense: 10 mL; Refill: 0       A-(assessment): ear is clogged this AM. Swollen lymph nodes. Inner ear pain and ear numbness.      R-(recommendations): talked with Dr. Toure, who will send in some antibiotics for patient, pended for provider review.     Daphne RAJAN RN

## 2025-06-14 ENCOUNTER — HOSPITAL ENCOUNTER (EMERGENCY)
Facility: CLINIC | Age: 35
Discharge: LEFT BEFORE DISCHARGE | End: 2025-06-14
Admitting: PHYSICIAN ASSISTANT
Payer: COMMERCIAL

## 2025-06-14 VITALS
RESPIRATION RATE: 17 BRPM | HEART RATE: 65 BPM | WEIGHT: 230.8 LBS | SYSTOLIC BLOOD PRESSURE: 176 MMHG | BODY MASS INDEX: 30.59 KG/M2 | HEIGHT: 73 IN | TEMPERATURE: 97.8 F | OXYGEN SATURATION: 98 % | DIASTOLIC BLOOD PRESSURE: 95 MMHG

## 2025-06-14 PROCEDURE — 99281 EMR DPT VST MAYX REQ PHY/QHP: CPT

## 2025-06-14 ASSESSMENT — COLUMBIA-SUICIDE SEVERITY RATING SCALE - C-SSRS
1. IN THE PAST MONTH, HAVE YOU WISHED YOU WERE DEAD OR WISHED YOU COULD GO TO SLEEP AND NOT WAKE UP?: NO
6. HAVE YOU EVER DONE ANYTHING, STARTED TO DO ANYTHING, OR PREPARED TO DO ANYTHING TO END YOUR LIFE?: NO
2. HAVE YOU ACTUALLY HAD ANY THOUGHTS OF KILLING YOURSELF IN THE PAST MONTH?: NO

## 2025-06-15 NOTE — ED TRIAGE NOTES
Pt arrives to ed with c/o of wound check to belly button, pt scratched belly button 3 days ago and since then belly button has been red and oozing pus. No fever in triage     Triage Assessment (Adult)       Row Name 06/14/25 2031          Triage Assessment    Airway WDL WDL        Respiratory WDL    Respiratory WDL WDL        Skin Circulation/Temperature WDL    Skin Circulation/Temperature WDL X        Cardiac WDL    Cardiac WDL WDL        Cognitive/Neuro/Behavioral WDL    Cognitive/Neuro/Behavioral WDL WDL

## 2025-06-16 ENCOUNTER — OFFICE VISIT (OUTPATIENT)
Dept: URGENT CARE | Facility: URGENT CARE | Age: 35
End: 2025-06-16

## 2025-06-16 VITALS
WEIGHT: 230 LBS | TEMPERATURE: 97.5 F | HEART RATE: 67 BPM | BODY MASS INDEX: 32.2 KG/M2 | SYSTOLIC BLOOD PRESSURE: 136 MMHG | OXYGEN SATURATION: 98 % | RESPIRATION RATE: 16 BRPM | DIASTOLIC BLOOD PRESSURE: 80 MMHG | HEIGHT: 71 IN

## 2025-06-16 DIAGNOSIS — H93.12 TINNITUS, LEFT: Primary | ICD-10-CM

## 2025-06-16 DIAGNOSIS — L01.00 IMPETIGO: ICD-10-CM

## 2025-06-16 PROCEDURE — 87077 CULTURE AEROBIC IDENTIFY: CPT | Performed by: PHYSICIAN ASSISTANT

## 2025-06-16 PROCEDURE — 87186 SC STD MICRODIL/AGAR DIL: CPT | Performed by: PHYSICIAN ASSISTANT

## 2025-06-16 PROCEDURE — 87070 CULTURE OTHR SPECIMN AEROBIC: CPT | Performed by: PHYSICIAN ASSISTANT

## 2025-06-16 PROCEDURE — 99213 OFFICE O/P EST LOW 20 MIN: CPT | Performed by: PHYSICIAN ASSISTANT

## 2025-06-16 RX ORDER — CEPHALEXIN 500 MG/1
500 CAPSULE ORAL 3 TIMES DAILY
Qty: 21 CAPSULE | Refills: 0 | Status: SHIPPED | OUTPATIENT
Start: 2025-06-16 | End: 2025-06-19

## 2025-06-16 NOTE — PROGRESS NOTES
[facility]  [address] [address]  Phone: [phone]  Fax: [phone]    Patient:  Darrin Baez, Date of birth 1990  Date of Visit:  06/16/2025  Referring Provider No ref. provider found    Patient presents with:  Infection: States last week had a itch by his navel scratched it now area red did notice some drainage from area using Neosporin on it         ICD-10-CM    1. Tinnitus, left  H93.12 Adult Audiology  Referral     Adult ENT  Referral      2. Impetigo  L01.00 cephALEXin (KEFLEX) 500 MG capsule     Wound Aerobic Bacterial Culture Routine Without Gram Stain          Patient Instructions   -Complete antibiotics as ordered. Take with food to help prevent stomach upset.  -Wash your hands before and after touching any cuts, scratches, or bandages to prevent infections.  -Keep the infected area clean; warm water irrigation.  -Leaving open to air is best.   -Tylenol or Ibuprofen as needed for discomfort  -If develop a fever, increased redness, pain, drainage or swelling from the area, should follow up for reevaluation.  -Should see improvement in next 2-3 days after starting antibiotics. Follow up if no improvement      Assessment & Plan      Assessment  - Infection likely due to Staphylococcus, presenting with impetigo characterized by honey-colored crusting.  - Dermatitis potentially aggravated by Neosporin use.    Plan  - Start cephalexin 3 times per day for 7 days, with food to avoid stomach upset.  - Perform daily cleansing of the wound with warm water and ensure it is thoroughly dried using a blow dryer on a cool setting.  - Discontinue use of Neosporin due to potential dermatitis; use Vaseline or Aquaphor instead.  - Avoid covering the wound with adhesive bandages to prevent worsening dermatitis.  - Referral to audiology and ENT for evaluation of ongoing tinnitus.         History of Present Illness     Pertinent history obtain  from: patient    Darrin Baez, a 35-year-old male, reports developing a scratch on his abdomen after scratching an itch. The following day, he noticed pain in the area. Despite cleaning the wound, it progressively worsened. By Saturday, 06/14, he observed redness and a red line extending from the scratch, causing anxiety. He visited the ER that evening but left after 45 minutes due to a long wait and feeling fine. He attempted self-care with Benadryl and Neosporin, but the redness persisted. On the day of the current visit, 06/16, he noticed increased redness around noon while at work, prompting him to seek medical attention. He has been using Aquaphor and Neosporin, but the condition has not improved over the past four days. He expresses concern about a possible infection and mentions a past ear infection treated with multiple antibiotics, resulting in ongoing tinnitus in the left ear for 3-4 months.    Problem List:  2022-01: Anxiety  2022-01: Blurry vision, bilateral  2022-01: Penile lesion  2022-01: Plantar warts  2022-01: Snoring  2018-07: Chest discomfort  2018-07: External hemorrhoid  2018-07: Marijuana use  2018-01: Elevated BP without diagnosis of hypertension      No past medical history on file.    Social History     Tobacco Use    Smoking status: Never     Passive exposure: Never    Smokeless tobacco: Never   Substance Use Topics    Alcohol use: Not on file       Physical Exam     Physical Exam  Vitals and nursing note reviewed.   Constitutional:       General: He is not in acute distress.     Appearance: He is not toxic-appearing or diaphoretic.   HENT:      Head: Normocephalic and atraumatic.   Eyes:      Conjunctiva/sclera: Conjunctivae normal.   Pulmonary:      Effort: Pulmonary effort is normal.   Skin:         Neurological:      Mental Status: He is alert.   Psychiatric:         Mood and Affect: Mood normal.         Behavior: Behavior normal.         Thought Content: Thought content normal.     "     Judgment: Judgment normal.         Vital signs:  /80   Pulse 67   Temp 97.5  F (36.4  C) (Oral)   Resp 16   Ht 1.803 m (5' 11\")   Wt 104.3 kg (230 lb)   SpO2 98%   BMI 32.08 kg/m        Data          Consent was obtained from the patient to use an AI documentation tool in the creation of  this note       "

## 2025-06-16 NOTE — PROGRESS NOTES
Urgent Care Clinic Visit    Chief Complaint   Patient presents with    Infection     States last week had a itch by his navel scratched it now area red did notice some drainage from area using Neosporin on it

## 2025-06-16 NOTE — PATIENT INSTRUCTIONS
-Complete antibiotics as ordered. Take with food to help prevent stomach upset.  -Wash your hands before and after touching any cuts, scratches, or bandages to prevent infections.  -Keep the infected area clean; warm water irrigation.  -Leaving open to air is best.   -Tylenol or Ibuprofen as needed for discomfort  -If develop a fever, increased redness, pain, drainage or swelling from the area, should follow up for reevaluation.  -Should see improvement in next 2-3 days after starting antibiotics. Follow up if no improvement

## 2025-06-17 ENCOUNTER — RESULTS FOLLOW-UP (OUTPATIENT)
Dept: URGENT CARE | Facility: URGENT CARE | Age: 35
End: 2025-06-17

## 2025-06-17 ENCOUNTER — PATIENT OUTREACH (OUTPATIENT)
Dept: CARE COORDINATION | Facility: CLINIC | Age: 35
End: 2025-06-17

## 2025-06-18 LAB — BACTERIA WND CULT: ABNORMAL

## 2025-06-18 RX ORDER — DOXYCYCLINE 100 MG/1
100 CAPSULE ORAL 2 TIMES DAILY
Qty: 14 CAPSULE | Refills: 0 | Status: SHIPPED | OUTPATIENT
Start: 2025-06-18 | End: 2025-06-25

## 2025-06-19 ENCOUNTER — PATIENT OUTREACH (OUTPATIENT)
Dept: CARE COORDINATION | Facility: CLINIC | Age: 35
End: 2025-06-19